# Patient Record
Sex: FEMALE | Race: BLACK OR AFRICAN AMERICAN | NOT HISPANIC OR LATINO | Employment: FULL TIME | URBAN - METROPOLITAN AREA
[De-identification: names, ages, dates, MRNs, and addresses within clinical notes are randomized per-mention and may not be internally consistent; named-entity substitution may affect disease eponyms.]

---

## 2021-03-27 ENCOUNTER — IMMUNIZATION (OUTPATIENT)
Dept: PRIMARY CARE CLINIC | Facility: CLINIC | Age: 21
End: 2021-03-27
Payer: OTHER GOVERNMENT

## 2021-03-27 DIAGNOSIS — Z23 NEED FOR VACCINATION: Primary | ICD-10-CM

## 2021-03-27 PROCEDURE — 91300 COVID-19, MRNA, LNP-S, PF, 30 MCG/0.3 ML DOSE VACCINE: CPT | Mod: ,,, | Performed by: INTERNAL MEDICINE

## 2021-03-27 PROCEDURE — 0001A COVID-19, MRNA, LNP-S, PF, 30 MCG/0.3 ML DOSE VACCINE: CPT | Mod: CV19,,, | Performed by: INTERNAL MEDICINE

## 2021-03-27 PROCEDURE — 91300 COVID-19, MRNA, LNP-S, PF, 30 MCG/0.3 ML DOSE VACCINE: ICD-10-PCS | Mod: ,,, | Performed by: INTERNAL MEDICINE

## 2021-03-27 PROCEDURE — 0001A COVID-19, MRNA, LNP-S, PF, 30 MCG/0.3 ML DOSE VACCINE: ICD-10-PCS | Mod: CV19,,, | Performed by: INTERNAL MEDICINE

## 2021-04-17 ENCOUNTER — IMMUNIZATION (OUTPATIENT)
Dept: PRIMARY CARE CLINIC | Facility: CLINIC | Age: 21
End: 2021-04-17
Payer: OTHER GOVERNMENT

## 2021-04-17 DIAGNOSIS — Z23 NEED FOR VACCINATION: Primary | ICD-10-CM

## 2021-04-17 PROCEDURE — 91300 COVID-19, MRNA, LNP-S, PF, 30 MCG/0.3 ML DOSE VACCINE: CPT | Mod: ,,, | Performed by: INTERNAL MEDICINE

## 2021-04-17 PROCEDURE — 0002A COVID-19, MRNA, LNP-S, PF, 30 MCG/0.3 ML DOSE VACCINE: ICD-10-PCS | Mod: CV19,,, | Performed by: INTERNAL MEDICINE

## 2021-04-17 PROCEDURE — 91300 COVID-19, MRNA, LNP-S, PF, 30 MCG/0.3 ML DOSE VACCINE: ICD-10-PCS | Mod: ,,, | Performed by: INTERNAL MEDICINE

## 2021-04-17 PROCEDURE — 0002A COVID-19, MRNA, LNP-S, PF, 30 MCG/0.3 ML DOSE VACCINE: CPT | Mod: CV19,,, | Performed by: INTERNAL MEDICINE

## 2024-04-01 LAB
B-HCG UR QL: NEGATIVE
GROUP A STREP, MOLECULAR: POSITIVE
HCV AB SERPL QL IA: NEGATIVE
HEP C VIRUS HOLD SPECIMEN: NORMAL
HIV 1+2 AB+HIV1 P24 AG SERPL QL IA: NEGATIVE

## 2024-04-01 PROCEDURE — 87389 HIV-1 AG W/HIV-1&-2 AB AG IA: CPT | Performed by: EMERGENCY MEDICINE

## 2024-04-01 PROCEDURE — 81025 URINE PREGNANCY TEST: CPT | Performed by: NURSE PRACTITIONER

## 2024-04-01 PROCEDURE — 86803 HEPATITIS C AB TEST: CPT | Performed by: EMERGENCY MEDICINE

## 2024-04-01 PROCEDURE — 87651 STREP A DNA AMP PROBE: CPT | Performed by: NURSE PRACTITIONER

## 2024-04-01 PROCEDURE — 99285 EMERGENCY DEPT VISIT HI MDM: CPT | Mod: 25

## 2024-04-02 ENCOUNTER — HOSPITAL ENCOUNTER (EMERGENCY)
Facility: HOSPITAL | Age: 24
Discharge: HOME | End: 2024-04-02
Attending: STUDENT IN AN ORGANIZED HEALTH CARE EDUCATION/TRAINING PROGRAM
Payer: COMMERCIAL

## 2024-04-02 VITALS
BODY MASS INDEX: 37.65 KG/M2 | SYSTOLIC BLOOD PRESSURE: 131 MMHG | OXYGEN SATURATION: 99 % | HEART RATE: 79 BPM | RESPIRATION RATE: 18 BRPM | HEIGHT: 65 IN | DIASTOLIC BLOOD PRESSURE: 88 MMHG | TEMPERATURE: 99 F | WEIGHT: 226 LBS

## 2024-04-02 DIAGNOSIS — J02.0 STREP PHARYNGITIS: Primary | ICD-10-CM

## 2024-04-02 LAB
ALBUMIN SERPL BCP-MCNC: 4 G/DL (ref 3.5–5.2)
ALP SERPL-CCNC: 59 U/L (ref 55–135)
ALT SERPL W/O P-5'-P-CCNC: 10 U/L (ref 10–44)
ANION GAP SERPL CALC-SCNC: 13 MMOL/L (ref 8–16)
AST SERPL-CCNC: 15 U/L (ref 10–40)
BASOPHILS # BLD AUTO: 0.02 K/UL (ref 0–0.2)
BASOPHILS NFR BLD: 0.2 % (ref 0–1.9)
BILIRUB SERPL-MCNC: 1.2 MG/DL (ref 0.1–1)
BUN SERPL-MCNC: 7 MG/DL (ref 6–20)
CALCIUM SERPL-MCNC: 9.6 MG/DL (ref 8.7–10.5)
CHLORIDE SERPL-SCNC: 105 MMOL/L (ref 95–110)
CO2 SERPL-SCNC: 21 MMOL/L (ref 23–29)
CREAT SERPL-MCNC: 0.7 MG/DL (ref 0.5–1.4)
DIFFERENTIAL METHOD BLD: ABNORMAL
EOSINOPHIL # BLD AUTO: 0.1 K/UL (ref 0–0.5)
EOSINOPHIL NFR BLD: 0.7 % (ref 0–8)
ERYTHROCYTE [DISTWIDTH] IN BLOOD BY AUTOMATED COUNT: 11.7 % (ref 11.5–14.5)
EST. GFR  (NO RACE VARIABLE): >60 ML/MIN/1.73 M^2
GLUCOSE SERPL-MCNC: 100 MG/DL (ref 70–110)
HCT VFR BLD AUTO: 37.6 % (ref 37–48.5)
HETEROPH AB SERPL QL IA: NEGATIVE
HGB BLD-MCNC: 12.5 G/DL (ref 12–16)
IMM GRANULOCYTES # BLD AUTO: 0.04 K/UL (ref 0–0.04)
IMM GRANULOCYTES NFR BLD AUTO: 0.5 % (ref 0–0.5)
LYMPHOCYTES # BLD AUTO: 2.1 K/UL (ref 1–4.8)
LYMPHOCYTES NFR BLD: 24.5 % (ref 18–48)
MCH RBC QN AUTO: 31.4 PG (ref 27–31)
MCHC RBC AUTO-ENTMCNC: 33.2 G/DL (ref 32–36)
MCV RBC AUTO: 95 FL (ref 82–98)
MONOCYTES # BLD AUTO: 0.6 K/UL (ref 0.3–1)
MONOCYTES NFR BLD: 7.5 % (ref 4–15)
NEUTROPHILS # BLD AUTO: 5.7 K/UL (ref 1.8–7.7)
NEUTROPHILS NFR BLD: 66.6 % (ref 38–73)
NRBC BLD-RTO: 0 /100 WBC
PLATELET # BLD AUTO: 218 K/UL (ref 150–450)
PMV BLD AUTO: 11.6 FL (ref 9.2–12.9)
POTASSIUM SERPL-SCNC: 3.4 MMOL/L (ref 3.5–5.1)
PROT SERPL-MCNC: 8.1 G/DL (ref 6–8.4)
RBC # BLD AUTO: 3.98 M/UL (ref 4–5.4)
SODIUM SERPL-SCNC: 139 MMOL/L (ref 136–145)
WBC # BLD AUTO: 8.57 K/UL (ref 3.9–12.7)

## 2024-04-02 PROCEDURE — 63600175 PHARM REV CODE 636 W HCPCS: Performed by: STUDENT IN AN ORGANIZED HEALTH CARE EDUCATION/TRAINING PROGRAM

## 2024-04-02 PROCEDURE — 80053 COMPREHEN METABOLIC PANEL: CPT | Performed by: NURSE PRACTITIONER

## 2024-04-02 PROCEDURE — 25000003 PHARM REV CODE 250: Performed by: STUDENT IN AN ORGANIZED HEALTH CARE EDUCATION/TRAINING PROGRAM

## 2024-04-02 PROCEDURE — 86308 HETEROPHILE ANTIBODY SCREEN: CPT | Performed by: NURSE PRACTITIONER

## 2024-04-02 PROCEDURE — 25500020 PHARM REV CODE 255: Performed by: STUDENT IN AN ORGANIZED HEALTH CARE EDUCATION/TRAINING PROGRAM

## 2024-04-02 PROCEDURE — 85025 COMPLETE CBC W/AUTO DIFF WBC: CPT | Performed by: NURSE PRACTITIONER

## 2024-04-02 PROCEDURE — 96368 THER/DIAG CONCURRENT INF: CPT

## 2024-04-02 PROCEDURE — 96361 HYDRATE IV INFUSION ADD-ON: CPT

## 2024-04-02 PROCEDURE — 96365 THER/PROPH/DIAG IV INF INIT: CPT

## 2024-04-02 PROCEDURE — 96375 TX/PRO/DX INJ NEW DRUG ADDON: CPT

## 2024-04-02 PROCEDURE — S5010 5% DEXTROSE AND 0.45% SALINE: HCPCS | Performed by: STUDENT IN AN ORGANIZED HEALTH CARE EDUCATION/TRAINING PROGRAM

## 2024-04-02 PROCEDURE — 96366 THER/PROPH/DIAG IV INF ADDON: CPT

## 2024-04-02 RX ORDER — CLINDAMYCIN PHOSPHATE 600 MG/50ML
600 INJECTION, SOLUTION INTRAVENOUS ONCE
Status: COMPLETED | OUTPATIENT
Start: 2024-04-02 | End: 2024-04-02

## 2024-04-02 RX ORDER — AMOXICILLIN AND CLAVULANATE POTASSIUM 875; 125 MG/1; MG/1
1 TABLET, FILM COATED ORAL 2 TIMES DAILY
Qty: 14 TABLET | Refills: 0 | Status: SHIPPED | OUTPATIENT
Start: 2024-04-02

## 2024-04-02 RX ORDER — DEXAMETHASONE SODIUM PHOSPHATE 4 MG/ML
10 INJECTION, SOLUTION INTRA-ARTICULAR; INTRALESIONAL; INTRAMUSCULAR; INTRAVENOUS; SOFT TISSUE
Status: COMPLETED | OUTPATIENT
Start: 2024-04-02 | End: 2024-04-02

## 2024-04-02 RX ORDER — METHYLPREDNISOLONE 4 MG/1
TABLET ORAL
Qty: 1 EACH | Refills: 0 | Status: SHIPPED | OUTPATIENT
Start: 2024-04-02

## 2024-04-02 RX ORDER — ONDANSETRON 4 MG/1
4 TABLET, ORALLY DISINTEGRATING ORAL EVERY 6 HOURS PRN
Qty: 30 TABLET | Refills: 0 | Status: SHIPPED | OUTPATIENT
Start: 2024-04-02

## 2024-04-02 RX ORDER — DEXTROSE MONOHYDRATE AND SODIUM CHLORIDE 5; .45 G/100ML; G/100ML
INJECTION, SOLUTION INTRAVENOUS CONTINUOUS
Status: DISCONTINUED | OUTPATIENT
Start: 2024-04-02 | End: 2024-04-02 | Stop reason: HOSPADM

## 2024-04-02 RX ADMIN — DEXTROSE MONOHYDRATE AND SODIUM CHLORIDE: 5; .45 INJECTION, SOLUTION INTRAVENOUS at 05:04

## 2024-04-02 RX ADMIN — IOHEXOL 75 ML: 350 INJECTION, SOLUTION INTRAVENOUS at 06:04

## 2024-04-02 RX ADMIN — CEFTRIAXONE SODIUM 2 G: 2 INJECTION, POWDER, FOR SOLUTION INTRAMUSCULAR; INTRAVENOUS at 05:04

## 2024-04-02 RX ADMIN — CLINDAMYCIN PHOSPHATE 600 MG: 600 INJECTION, SOLUTION INTRAVENOUS at 05:04

## 2024-04-02 RX ADMIN — DEXAMETHASONE SODIUM PHOSPHATE 10 MG: 4 INJECTION INTRA-ARTICULAR; INTRALESIONAL; INTRAMUSCULAR; INTRAVENOUS; SOFT TISSUE at 05:04

## 2024-04-02 NOTE — ED PROVIDER NOTES
ED Provider Note - 4/1/2024    History     Chief Complaint   Patient presents with    Sore Throat     Patient presens to ED from [facility] with c/o throat pain, swelling x 2-3 days.       The history is provided by the patient.        Sarah Barajas is a 23 y.o. year old female with past medical and surgical history as seen below, presenting with chief complaint of throat pain and swelling which onset about 2-3 days ago. Symptoms are constant and moderate in severity. Salt water gargling was not effective in mitigating symptoms. Associated sxs include muffled voice. Patient denies any difficulty swallowing, CP, N/V, and all other sxs at this time. No further complaints or concerns at this time.         No past medical history on file.  No past surgical history on file.      No family history on file.     Social Determinants of Health with Concerns     Tobacco Use: Not on file   Alcohol Use: Not on file   Financial Resource Strain: Not on file   Food Insecurity: Not on file   Transportation Needs: Not on file   Physical Activity: Not on file   Stress: Not on file   Social Connections: Not on file   Housing Stability: Not on file   Depression: Not on file      Review of patient's allergies indicates:  No Known Allergies    Review of Systems     A full Review of Systems (ROS) was performed and was negative unless otherwise stated in the HPI.      Physical Exam     Vitals:    04/02/24 0703 04/02/24 0732 04/02/24 0806 04/02/24 0832   BP: 110/72 120/83 138/69 131/88   BP Location: Right arm Right arm Right arm Right arm   Patient Position: Lying Lying Lying Lying   Pulse: 78 80 78 79   Resp: 18 18 18 18   Temp:    98.7 °F (37.1 °C)   TempSrc:    Oral   SpO2: 99% 99% 98% 99%   Weight:       Height:            Physical Exam    Nursing note and vitals reviewed.  Constitutional: She appears well-developed and well-nourished. No distress.   HENT:   Head: Normocephalic and atraumatic.   Right Ear: External ear  normal.   Left Ear: External ear normal.   Nose: Nose normal.   Mouth/Throat: Tonsillar abscesses (right side) present.   Eyes: Conjunctivae and EOM are normal. Pupils are equal, round, and reactive to light.   Neck: Neck supple.   Normal range of motion.  Cardiovascular:  Normal rate and regular rhythm.           Pulmonary/Chest: Breath sounds normal. No respiratory distress.   Musculoskeletal:         General: No edema. Normal range of motion.      Cervical back: Normal range of motion and neck supple.     Neurological: She is alert and oriented to person, place, and time. She has normal strength. No cranial nerve deficit or sensory deficit. GCS eye subscore is 4. GCS verbal subscore is 5. GCS motor subscore is 6.   Skin: Skin is warm and dry. No rash noted.   Psychiatric: She has a normal mood and affect. Thought content normal.         Lab Results- Independently reviewed by myself      Labs Reviewed   GROUP A STREP, MOLECULAR - Abnormal; Notable for the following components:       Result Value    Group A Strep, Molecular Positive (*)     All other components within normal limits   CBC W/ AUTO DIFFERENTIAL - Abnormal; Notable for the following components:    RBC 3.98 (*)     MCH 31.4 (*)     All other components within normal limits   COMPREHENSIVE METABOLIC PANEL - Abnormal; Notable for the following components:    Potassium 3.4 (*)     CO2 21 (*)     Total Bilirubin 1.2 (*)     All other components within normal limits   HIV 1 / 2 ANTIBODY    Narrative:     Release to patient->Immediate   HEPATITIS C ANTIBODY    Narrative:     Release to patient->Immediate   HEP C VIRUS HOLD SPECIMEN    Narrative:     Release to patient->Immediate   PREGNANCY TEST, URINE RAPID    Narrative:     Specimen Source->Urine   HETEROPHILE AB SCREEN           Imaging     Imaging Results              CT Soft Tissue Neck With Contrast (Final result)  Result time 04/02/24 07:02:19      Final result by Lia Campos MD (Timothy)  (04/02/24 07:02:19)                   Impression:     Left tonsillitis with early abscess or phlegmon measuring 1.7 x 0.9 x 2.1 cm.  There is associated narrowing of the airway.    All CT scans at [this location] are performed using dose modulation techniques as appropriate to a performed exam including the following:  Automated exposure control; adjustment of the mA and/or kV according to patient size (this includes techniques or standardized protocols for targeted exams where dose is matched to indication / reason for exam; i.e. extremities or head); use of iterative reconstruction technique.    Finalized on: 4/2/2024 7:02 AM By:  Edmar Campos MD  [facility]# [id number]      2024-04-02 07:04:29.529    [facility]               Narrative:    EXAM:  CT SOFT TISSUE NECK WITH CONTRAST    CLINICAL HISTORY: Neck mass.    TECHNIQUE: Axial images were acquired.  The patient was given IV contrast.    FINDINGS: There is soft tissue swelling noted involving the left oropharynx associated with a ill-defined low density area which likely corresponds to early abscess or phlegmon.  There is associated narrowing of the airway.  It measures 1.7 x 0.9 x 2.1 cm.      The parotid and submandibular glands appear normal.  The thyroid gland appears normal.  There are few scattered benign-appearing lymph nodes bilaterally.  They appear benign and reactive.  No pathologically enlarged lymph nodes suspected.  The larynx and airway appears normal.                                                 ED Discussion:    6:00 AM: Dr. Bojorquez transfers care of patient to Dr. Washington pending lab results.     7:00 AM: Re-evaluated pt. Agree with Dr. Bojorquez' evaluation. Pt is resting comfortably and is in no acute distress. D/w pt all pertinent results. D/w pt any concerns expressed at this time. Answered all questions. Pt expresses understanding at this time.      7:38 AM Consult: Dr. Washington discussed the case with Dr. Mary (ENT). Agrees with current management.  Recommends no need for emergent surgical intervention and discharge with Augmentin and medrol dose pack with close f/u in clinic and to return to ER if any issues arise.    7:58 AM: Reassessed pt at this time.  Discussed with pt all pertinent ED information and results. Discussed pt dx and plan of tx. Gave pt all f/u and return to the ED instructions. All questions and concerns were addressed at this time. Pt expresses understanding of information and instructions, and is comfortable with plan to discharge. Pt is stable for discharge.    I discussed with patient and/or family/caretaker that evaluation in the ED does not suggest any emergent or life threatening medical conditions requiring immediate intervention beyond what was provided in the ED, and I believe patient is safe for discharge.  Regardless, an unremarkable evaluation in the ED does not preclude the development or presence of a serious of life threatening condition. As such, patient was instructed to return immediately for any worsening or change in current symptoms.     Regarding STREP THROAT, I recommended that the patient get more rest, drink plenty of fluids, and take all medications as prescribed.  Other recommendations to manage symptoms associated with strep throat include: drinking juice, milk shakes, tea, or soup if throat is too sore to eat solid food; gargling with a small amount of warm salt water (mix 1 teaspoon of salt and 1 cup of warm water to make salt water; and suck on crushed ice, hard candy, cough drops, or throat lozenges). To prevent the spread of strep throat, I reiterated the importance of not sharing food or drinks with others, washing hands frequently, and replacing toothbrush 24 hours after taking antibiotics. Patient will be able to return to work or school 24 hours after initiating antibiotic treatment.    Rest  Drink plenty of clear fluids   Nasal saline spray to clear nasal drainage and help with nasal congestion  Zyrtec  or Claritin to help dry mucus and post nasal drip  Mucinex or Mucinex DM for cough and chest congestion  Tylenol or Ibuprofen for fever, headache and body aches  Warm salt water gargles for throat comfort  Chloraseptic spray or lozenges for throat comfort  RTC with no improvement or worsening      ED Course         Procedures         Orders Placed This Encounter    Group A Strep, Molecular    CT Soft Tissue Neck With Contrast    HIV 1/2 Ag/Ab (4th Gen)    Hepatitis C Antibody    HCV Virus Hold Specimen    Pregnancy, urine rapid (UPT)    Heterophile Ab Screen    CBC auto differential    Comprehensive metabolic panel    Saline lock IV    PFC Facilitated Request    dextrose 5 % and 0.45 % NaCl infusion    dexAMETHasone injection 10 mg    cefTRIAXone (ROCEPHIN) 2 g in dextrose 5 % in water (D5W) 100 mL IVPB (MB+)    clindamycin in D5W 600 mg/50 mL IVPB 600 mg    iohexoL (OMNIPAQUE 350) injection 75 mL    amoxicillin-clavulanate 875-125mg (AUGMENTIN) 875-125 mg per tablet    ondansetron (ZOFRAN-ODT) 4 MG TbDL    methylPREDNISolone (MEDROL DOSEPACK) 4 mg tablet                   Medical Decision Making       The patient's list of active medical problems, social history, medications, and allergies as documented per RN staff has been reviewed.           Medical Decision Making  Amount and/or Complexity of Data Reviewed  Labs: ordered. Decision-making details documented in ED Course.  Radiology: ordered and independent interpretation performed. Decision-making details documented in ED Course.  Discussion of management or test interpretation with external provider(s): 4:29 AM: Re-evaluated pt. Pt is resting comfortably and is in no acute distress.  Pt reports right ankle pain and swelling, denies any falls, and edema is present.    6:00 AM: Dr. Bojorquez transfers care of patient to Dr. Washington pending imaging results.       Risk  OTC drugs.  Prescription drug management.  Parenteral controlled substances.  Decision regarding  hospitalization.  Emergency major surgery.  Risk Details: OTC drugs, prescription drugs and controlled substances considered.  Due to patient's symptoms improving and pain controlled pain medications ordered appropriately.  Ddx: uri, covid, flu, strep, peritonsillar abscess, infection  Hospitalization and emergent surgery considered.  Due to patient's symptoms improving, no airway issues, able to manage oral secretions, tolerate PO and ambulate without assistance, patient will be able to be discharged with close f/u c pcp/ENT within one week and to return to ER if any issues arise.                 SCRIBE #1 NOTE: IEfraín am scribing for, and in the presence of,  Shaq Bojorquez MD. I have scribed the following portions of the note - Other sections scribed: HPI ROS Phys..   SCRIBE #2 NOTE: Aram SUMMERS am scribing for, and in the presence of,  Dani Washington MD. I have scribed the remaining portions of the note not scribed by Scribe #1.        ED Prescriptions       Medication Sig Dispense Start Date End Date Auth. Provider    amoxicillin-clavulanate 875-125mg (AUGMENTIN) 875-125 mg per tablet Take 1 tablet by mouth 2 (two) times daily. 14 tablet 4/2/2024 -- Dani Washington Jr., MD    ondansetron (ZOFRAN-ODT) 4 MG TbDL Take 1 tablet (4 mg total) by mouth every 6 (six) hours as needed. 30 tablet 4/2/2024 -- Dani Washington Jr., MD    methylPREDNISolone (MEDROL DOSEPACK) 4 mg tablet use as directed 1 each 4/2/2024 -- Dani Washington Jr., MD              Clinical Impression       Follow-up Information       Follow up With Specialties Details Why Contact Info    [facility]  Schedule an appointment as soon as possible for a visit in 1 week  [address] [facility] [address] [phone]      [facility] ENT Surgery Otolaryngology Schedule an appointment as soon as possible for a visit in 1 week  [address] [facility] [address] [phone]    [facility]  Dept. Emergency Medicine  As needed, If symptoms worsen [address] [facility] [address] [phone]            Referrals:  No orders of the defined types were placed in this encounter.      Disposition   ED Disposition Condition    Discharge Stable        Disposition:   Disposition: Discharged  Condition: Stable       Diagnosis    ICD-10-CM ICD-9-CM   1. Strep pharyngitis  J02.0 034.0               DISCLAIMER: This note was prepared with [employer] voice recognition transcription software. Garbled syntax, mangled pronouns, and other bizarre constructions may be attributed to that software system.       Dani Washington Jr., MD  04/02/24 1002

## 2024-04-02 NOTE — ED NOTES
Bed: Dispo 1  Expected date:   Expected time:   Means of arrival: Personal Transportation  Comments:

## 2024-04-02 NOTE — FIRST PROVIDER EVALUATION
"Medical screening examination initiated.  I have conducted a focused provider triage encounter, findings are as follows:    Brief history of present illness:  23-year-old female who presents to ER complaining of sore throat for 2 days.  Was sent to ER from urgent care for rule out peritonsillar abscess.  Reports negative strep prior to arrival.    Vitals:    04/01/24 2208   BP: (!) 154/99   BP Location: Right arm   Patient Position: Sitting   Pulse: 108   Resp: 20   Temp: 98.7 °F (37.1 °C)   TempSrc: Oral   SpO2: 99%   Weight: 102.5 kg (225 lb 15.5 oz)   Height: 5' 5" (1.651 m)       Pertinent physical exam:  Erythema, swelling, and exudate your pharynx.  Airway is patent.    Brief workup plan:  Strep, mono, labs, CT neck.      Preliminary workup initiated; this workup will be continued and followed by the physician or advanced practice provider that is assigned to the patient when roomed.  "

## 2024-04-09 ENCOUNTER — OFFICE VISIT (OUTPATIENT)
Dept: OTOLARYNGOLOGY | Facility: CLINIC | Age: 24
End: 2024-04-09
Payer: COMMERCIAL

## 2024-04-09 VITALS — WEIGHT: 223.13 LBS | BODY MASS INDEX: 37.13 KG/M2

## 2024-04-09 DIAGNOSIS — Z87.09 HISTORY OF PERITONSILLAR ABSCESS: Primary | ICD-10-CM

## 2024-04-09 PROCEDURE — 99204 OFFICE O/P NEW MOD 45 MIN: CPT | Mod: S$GLB,,, | Performed by: OTOLARYNGOLOGY

## 2024-04-09 PROCEDURE — 99999 PR PBB SHADOW E&M-EST. PATIENT-LVL II: CPT | Mod: PBBFAC,,, | Performed by: OTOLARYNGOLOGY

## 2024-04-09 NOTE — PROGRESS NOTES
Referring Provider:    Self, Aaareferral  No address on file  Subjective:   Patient: Sarah Barajas [id number], :2000   Visit date:2024 9:15 AM    Chief Complaint:  Abscess (Pt states that he had an abscess on her tonsils she was prescribed antibiotics and steroids and was told to follow up with ENT when they were complete )    HPI:    Prior notes reviewed by myself.  Clinical documentation obtained by nursing staff reviewed.       23-year-old female presents for follow-up on recent emergency room visit for peritonsillar abscess.  She had developed an acute sore throat during Easter weekend and subsequently was seen by the emergency room physician at [facility].  A CT scan was performed which demonstrated an early left-sided peritonsillar abscess or possible phlegmon.  She was started on oral Augmentin and steroids and instructed to follow-up with ENT.  Today she is asymptomatic.      Objective:     Physical Exam:  Vitals:  Wt 101.2 kg (223 lb 1.7 oz)   BMI 37.13 kg/m²   General appearance:  Well developed, well nourished    Ears:  Otoscopy of external auditory canals and tympanic membranes was normal, clinical speech reception thresholds grossly intact, no mass/lesion of auricle.    Nose:  No masses/lesions of external nose, nasal mucosa, septum, and turbinates were within normal limits.    Mouth:  No mass/lesion of lips, teeth, gums, hard/soft palate, tongue, tonsils, or oropharynx.    Neck & Lymphatics:  No cervical lymphadenopathy, no neck mass/crepitus/ asymmetry, trachea is midline, no thyroid enlargement/tenderness/mass.        [x]  Data Reviewed:    Lab Results   Component Value Date    WBC 8.57 2024    HGB 12.5 2024    HCT 37.6 2024    MCV 95 2024    EOSINOPHIL 0.7 2024         [x]  Independent interpretation of test: left early PTA vs phlegmon          CT Soft Tissue Neck With Contrast  Order: [id number]  Status: Final result       Visible to patient: No  (inaccessible in Patient Portal)       Next appt: None    0 Result Notes  Details    Reading Physician Reading Date Result Priority   Lia Campos (Juan Luis), MD  [phone] 4/2/2024      Narrative & Impression  EXAM:  CT SOFT TISSUE NECK WITH CONTRAST     CLINICAL HISTORY: Neck mass.     TECHNIQUE: Axial images were acquired.  The patient was given IV contrast.     FINDINGS: There is soft tissue swelling noted involving the left oropharynx associated with a ill-defined low density area which likely corresponds to early abscess or phlegmon.  There is associated narrowing of the airway.  It measures 1.7 x 0.9 x 2.1 cm.       The parotid and submandibular glands appear normal.  The thyroid gland appears normal.  There are few scattered benign-appearing lymph nodes bilaterally.  They appear benign and reactive.  No pathologically enlarged lymph nodes suspected.  The larynx and airway appears normal.        Impression:   Left tonsillitis with early abscess or phlegmon measuring 1.7 x 0.9 x 2.1 cm.  There is associated narrowing of the airway.     All CT scans at [this location] are performed using dose modulation techniques as appropriate to a performed exam including the following:  Automated exposure control; adjustment of the mA and/or kV according to patient size (this includes techniques or standardized protocols for targeted exams where dose is matched to indication / reason for exam; i.e. extremities or head); use of iterative reconstruction technique.     Finalized on: 4/2/2024 7:02 AM By:  Edmar Campos MD  [facility]# [id number]      2024-04-02 07:04:29.529    [facility]           Exam Ended: 04/02/24 06:14 CDT Last Resulted: 04/02/24 07:02 CDT           Assessment & Plan:   History of peritonsillar abscess          No evidence of peritonsillar abscess on exam.  She is currently asymptomatic.  We reviewed indications for tonsillectomy and at this point she elects to hold off.  I explained that if she had a 2nd peritonsillar  abscess or if she developed recurrent acute tonsillitis then I would recommend surgical intervention.  She understands and agrees with the plan    Mike Mary M.D.  Department of Otolaryngology - Head & Neck Surgery  [address] [facility]  [place] [address]  P: [phone]  F: [phone]        DISCLAIMER: This note was prepared with [employer] voice recognition transcription software. Garbled syntax, mangled pronouns, and other bizarre constructions may be attributed to that software system. While efforts were made to correct any mistakes made by this voice recognition program, some errors and/or omissions may remain in the note that were missed when the note was originally created.